# Patient Record
Sex: MALE | Race: WHITE | NOT HISPANIC OR LATINO | URBAN - METROPOLITAN AREA
[De-identification: names, ages, dates, MRNs, and addresses within clinical notes are randomized per-mention and may not be internally consistent; named-entity substitution may affect disease eponyms.]

---

## 2020-06-27 ENCOUNTER — TELEPHONE ENCOUNTER (OUTPATIENT)
Dept: URBAN - METROPOLITAN AREA CLINIC 92 | Facility: CLINIC | Age: 50
End: 2020-06-27

## 2020-06-27 RX ORDER — COLESEVELAM HYDROCHLORIDE 625 MG/1
1 TABLET TABLET, COATED ORAL TWICE A DAY
Qty: 180 TABLET | Refills: 0
Start: 2020-06-27

## 2020-09-28 ENCOUNTER — TELEPHONE ENCOUNTER (OUTPATIENT)
Dept: URBAN - METROPOLITAN AREA CLINIC 92 | Facility: CLINIC | Age: 50
End: 2020-09-28

## 2020-09-28 RX ORDER — COLESEVELAM HYDROCHLORIDE 625 MG/1
1 TABLET TABLET, COATED ORAL TWICE A DAY
Qty: 180 TABLET | Refills: 0
Start: 2020-06-27

## 2021-04-02 ENCOUNTER — TELEPHONE ENCOUNTER (OUTPATIENT)
Dept: URBAN - METROPOLITAN AREA CLINIC 118 | Facility: CLINIC | Age: 51
End: 2021-04-02

## 2021-10-19 ENCOUNTER — OFFICE VISIT (OUTPATIENT)
Dept: URBAN - METROPOLITAN AREA CLINIC 118 | Facility: CLINIC | Age: 51
End: 2021-10-19

## 2021-12-16 ENCOUNTER — WEB ENCOUNTER (OUTPATIENT)
Dept: URBAN - METROPOLITAN AREA CLINIC 118 | Facility: CLINIC | Age: 51
End: 2021-12-16

## 2021-12-16 RX ORDER — COLESEVELAM HYDROCHLORIDE 625 MG/1
1 TABLET TABLET, COATED ORAL TWICE A DAY
Qty: 180 TABLET | Refills: 0
Start: 2020-06-27

## 2021-12-22 ENCOUNTER — LAB OUTSIDE AN ENCOUNTER (OUTPATIENT)
Dept: URBAN - METROPOLITAN AREA CLINIC 118 | Facility: CLINIC | Age: 51
End: 2021-12-22

## 2021-12-22 ENCOUNTER — OFFICE VISIT (OUTPATIENT)
Dept: URBAN - METROPOLITAN AREA CLINIC 118 | Facility: CLINIC | Age: 51
End: 2021-12-22
Payer: COMMERCIAL

## 2021-12-22 DIAGNOSIS — R19.7 DIARRHEA, UNSPECIFIED TYPE: ICD-10-CM

## 2021-12-22 DIAGNOSIS — K50.10 CROHN'S DISEASE OF LARGE INTESTINE WITHOUT COMPLICATION: ICD-10-CM

## 2021-12-22 PROCEDURE — 99213 OFFICE O/P EST LOW 20 MIN: CPT | Performed by: INTERNAL MEDICINE

## 2021-12-22 RX ORDER — HYOSCYAMINE SULFATE 0.38 MG/1
1 TABLET TABLET, EXTENDED RELEASE ORAL
Qty: 60 | Refills: 5 | OUTPATIENT
Start: 2021-12-22 | End: 2022-06-20

## 2021-12-22 RX ORDER — COLESEVELAM HYDROCHLORIDE 625 MG/1
1 TABLET TABLET, COATED ORAL TWICE A DAY
Qty: 180 TABLET | Refills: 0 | Status: ACTIVE | COMMUNITY
Start: 2020-06-27

## 2021-12-22 NOTE — HPI-TODAY'S VISIT:
50 yo WM here for follow up.  Has a hx of Crohn's disease, last on meds in ~2015.  BMs 3-5x/d on Welchol, formed.  No GI bleed.  Occ abd discomfort, no N/V.  Weight is down 7# over the last 2 years, voluntarily.  In 5/2020, stool positive for C diff, and treated with Vancomycin.  Stool negative for fecal fat and WBC.

## 2022-01-20 LAB
A/G RATIO: 1.8
ALBUMIN: 4.7
ALKALINE PHOSPHATASE: 121
ALT (SGPT): 61
AST (SGOT): 36
BASO (ABSOLUTE): 0
BASOS: 0
BILIRUBIN, TOTAL: 1.5
BUN/CREATININE RATIO: 11
BUN: 10
C-REACTIVE PROTEIN, QUANT: 5
CALCIUM: 9.6
CARBON DIOXIDE, TOTAL: 22
CHLORIDE: 97
CREATININE: 0.9
EGFR IF AFRICN AM: 116
EGFR IF NONAFRICN AM: 100
EOS (ABSOLUTE): 0.1
EOS: 1
GLOBULIN, TOTAL: 2.6
GLUCOSE: 329
HEMATOCRIT: 43.8
HEMATOLOGY COMMENTS:: (no result)
HEMOGLOBIN: 14.9
IMMATURE CELLS: (no result)
IMMATURE GRANS (ABS): 0
IMMATURE GRANULOCYTES: 0
LYMPHS (ABSOLUTE): 2.2
LYMPHS: 27
MCH: 30.8
MCHC: 34
MCV: 91
MONOCYTES(ABSOLUTE): 0.5
MONOCYTES: 6
NEUTROPHILS (ABSOLUTE): 5.2
NEUTROPHILS: 66
NRBC: (no result)
PLATELETS: 331
POTASSIUM: 3.8
PROTEIN, TOTAL: 7.3
RBC: 4.83
RDW: 12.4
SODIUM: 137
VITAMIN B12: 588
WBC: 8

## 2022-04-29 ENCOUNTER — TELEPHONE ENCOUNTER (OUTPATIENT)
Dept: URBAN - METROPOLITAN AREA CLINIC 23 | Facility: CLINIC | Age: 52
End: 2022-04-29

## 2022-06-06 ENCOUNTER — OFFICE VISIT (OUTPATIENT)
Dept: URBAN - METROPOLITAN AREA SURGERY CENTER 23 | Facility: SURGERY CENTER | Age: 52
End: 2022-06-06
Payer: COMMERCIAL

## 2022-06-06 DIAGNOSIS — K50.80 CROHN'S COLITIS: ICD-10-CM

## 2022-06-06 PROCEDURE — G8907 PT DOC NO EVENTS ON DISCHARG: HCPCS | Performed by: INTERNAL MEDICINE

## 2022-06-06 PROCEDURE — 45380 COLONOSCOPY AND BIOPSY: CPT | Performed by: INTERNAL MEDICINE

## 2022-06-06 RX ORDER — COLESEVELAM HYDROCHLORIDE 625 MG/1
1 TABLET TABLET, COATED ORAL TWICE A DAY
Qty: 180 TABLET | Refills: 0 | Status: ACTIVE | COMMUNITY
Start: 2020-06-27

## 2022-06-06 RX ORDER — HYOSCYAMINE SULFATE 0.38 MG/1
1 TABLET TABLET, EXTENDED RELEASE ORAL
Qty: 60 | Refills: 5 | Status: ACTIVE | COMMUNITY
Start: 2021-12-22 | End: 2022-06-20

## 2023-04-11 ENCOUNTER — OFFICE VISIT (OUTPATIENT)
Dept: URBAN - METROPOLITAN AREA CLINIC 118 | Facility: CLINIC | Age: 53
End: 2023-04-11
Payer: COMMERCIAL

## 2023-04-11 VITALS
HEART RATE: 81 BPM | SYSTOLIC BLOOD PRESSURE: 142 MMHG | BODY MASS INDEX: 28.49 KG/M2 | TEMPERATURE: 97.7 F | HEIGHT: 70 IN | WEIGHT: 199 LBS | DIASTOLIC BLOOD PRESSURE: 89 MMHG

## 2023-04-11 DIAGNOSIS — R19.7 DIARRHEA, UNSPECIFIED TYPE: ICD-10-CM

## 2023-04-11 DIAGNOSIS — K50.10 CROHN'S DISEASE OF LARGE INTESTINE WITHOUT COMPLICATION: ICD-10-CM

## 2023-04-11 PROCEDURE — 99214 OFFICE O/P EST MOD 30 MIN: CPT | Performed by: INTERNAL MEDICINE

## 2023-04-11 RX ORDER — COLESEVELAM HYDROCHLORIDE 625 MG/1
1 TABLET TABLET, COATED ORAL TWICE A DAY
Qty: 180 TABLET | Refills: 0
Start: 2020-06-27

## 2023-04-11 RX ORDER — HYOSCYAMINE SULFATE 0.38 MG/1
1 TABLET TABLET, EXTENDED RELEASE ORAL
Qty: 60 | Refills: 5
Start: 2021-12-22 | End: 2023-10-08

## 2023-04-11 RX ORDER — COLESEVELAM HYDROCHLORIDE 625 MG/1
1 TABLET TABLET, COATED ORAL TWICE A DAY
Qty: 180 TABLET | Refills: 0 | Status: ACTIVE | COMMUNITY
Start: 2020-06-27

## 2023-04-11 NOTE — PHYSICAL EXAM GASTROINTESTINAL
Abdomen , soft, MILD LLQ tender, nondistended , no guarding or rigidity , no masses palpable , normal bowel sounds , Liver and Spleen , no hepatomegaly present , no hepatosplenomegaly , liver nontender , spleen not palpable

## 2023-04-11 NOTE — HPI-TODAY'S VISIT:
4/11/23 - 54 yo WM here for follow up of a hx of Crohn's disease.  He had active disease for ~ 22 years, and was on Remicade until 2011.  He was then on budesonide until 2015.  Since then, he has done relatively well, with no endoscopic evidence of active Crohn's disease. He has ongoing problems with diarrhea with upto 10 BMs/d, that is improved with Welchol bid to ~ 5x/d.  Stools formed but soft, with BRB on TP upto 1x/wk.  He has fecal incontinence at times, and will wear Depends at times.   No nocturnal diarrhea.  Has intermittent LLQ cramping discomfort, almost daily.  He is on metformin x 1 yr.  Has had voluntary weight loss.  No mouth ulcers.  He has joint aches constantly, long-standing. -------------------------------------------------------------------- 12/22/21 - 52 yo WM here for follow up.  Has a hx of Crohn's disease, last on meds in ~2015.  BMs 3-5x/d on Welchol, formed.  No GI bleed.  Occ abd discomfort, no N/V.  Weight is down 7# over the last 2 years, voluntarily.  In 5/2020, stool positive for C diff, and treated with Vancomycin.  Stool negative for fecal fat and WBC.

## 2023-04-17 LAB
A/G RATIO: 2.1
ABSOLUTE BASOPHILS: 39
ABSOLUTE EOSINOPHILS: 47
ABSOLUTE LYMPHOCYTES: 1997
ABSOLUTE MONOCYTES: 593
ABSOLUTE NEUTROPHILS: 5125
ALBUMIN: 4.8
ALKALINE PHOSPHATASE: 69
ALT (SGPT): 32
AST (SGOT): 19
BASOPHILS: 0.5
BILIRUBIN, TOTAL: 1.6
BUN/CREATININE RATIO: (no result)
BUN: 14
C-REACTIVE PROTEIN, QUANT: 1.8
CALCIUM: 9.9
CARBON DIOXIDE, TOTAL: 29
CHLORIDE: 100
CREATININE: 0.93
EGFR: 99
EOSINOPHILS: 0.6
GLOBULIN, TOTAL: 2.3
GLUCOSE: 77
HEMATOCRIT: 40.1
HEMOGLOBIN: 13.8
LYMPHOCYTES: 25.6
MCH: 29.5
MCHC: 34.4
MCV: 85.7
MONOCYTES: 7.6
MPV: 10.2
NEUTROPHILS: 65.7
PLATELET COUNT: 374
POTASSIUM: 4
PROTEIN, TOTAL: 7.1
RDW: 12.2
RED BLOOD CELL COUNT: 4.68
SODIUM: 138
WHITE BLOOD CELL COUNT: 7.8

## 2023-04-25 LAB — LEUKOCYTES: (no result)

## 2023-08-07 ENCOUNTER — TELEPHONE ENCOUNTER (OUTPATIENT)
Dept: URBAN - METROPOLITAN AREA CLINIC 118 | Facility: CLINIC | Age: 53
End: 2023-08-07

## 2023-08-07 RX ORDER — COLESEVELAM HYDROCHLORIDE 625 MG/1
1 TABLET TABLET, COATED ORAL TWICE A DAY
Qty: 180 TABLET | Refills: 0
Start: 2020-06-27

## 2023-11-08 ENCOUNTER — OFFICE VISIT (OUTPATIENT)
Dept: URBAN - METROPOLITAN AREA CLINIC 118 | Facility: CLINIC | Age: 53
End: 2023-11-08
Payer: COMMERCIAL

## 2023-11-08 VITALS
HEIGHT: 70 IN | TEMPERATURE: 98.1 F | BODY MASS INDEX: 29.49 KG/M2 | WEIGHT: 206 LBS | DIASTOLIC BLOOD PRESSURE: 94 MMHG | HEART RATE: 99 BPM | SYSTOLIC BLOOD PRESSURE: 146 MMHG

## 2023-11-08 DIAGNOSIS — R19.7 DIARRHEA, UNSPECIFIED TYPE: ICD-10-CM

## 2023-11-08 DIAGNOSIS — K50.10 CROHN'S DISEASE OF LARGE INTESTINE WITHOUT COMPLICATION: ICD-10-CM

## 2023-11-08 PROCEDURE — 99213 OFFICE O/P EST LOW 20 MIN: CPT | Performed by: INTERNAL MEDICINE

## 2023-11-08 RX ORDER — COLESEVELAM HYDROCHLORIDE 625 MG/1
1 TABLET TABLET, COATED ORAL TWICE A DAY
Qty: 180 TABLET | Refills: 0 | Status: ACTIVE | COMMUNITY
Start: 2020-06-27

## 2023-11-08 NOTE — HPI-TODAY'S VISIT:
11/8/23 - 54 yo WM here for follow up.  Doing relatively well, on Welchol bid, and qd Levbid.  BMs ~ 4-5x/d on average.  No GI bleed.  No abd pain, N/V, except occ nausea after meds.  Has ongoing problems with fecal incontinence, with occ Depends. ---------------------- 4/11/23 - 54 yo WM here for follow up of a hx of Crohn's disease.  He had active disease for ~ 22 years, and was on Remicade until 2011.  He was then on budesonide until 2015.  Since then, he has done relatively well, with no endoscopic evidence of active Crohn's disease. He has ongoing problems with diarrhea with upto 10 BMs/d, that is improved with Welchol bid to ~ 5x/d.  Stools formed but soft, with BRB on TP upto 1x/wk.  He has fecal incontinence at times, and will wear Depends at times.   No nocturnal diarrhea.  Has intermittent LLQ cramping discomfort, almost daily.  He is on metformin x 1 yr.  Has had voluntary weight loss.  No mouth ulcers.  He has joint aches constantly, long-standing. -------------------------------------------------------------------- 12/22/21 - 52 yo WM here for follow up.  Has a hx of Crohn's disease, last on meds in ~2015.  BMs 3-5x/d on Welchol, formed.  No GI bleed.  Occ abd discomfort, no N/V.  Weight is down 7# over the last 2 years, voluntarily.  In 5/2020, stool positive for C diff, and treated with Vancomycin.  Stool negative for fecal fat and WBC.

## 2023-11-21 ENCOUNTER — TELEPHONE ENCOUNTER (OUTPATIENT)
Dept: URBAN - METROPOLITAN AREA CLINIC 118 | Facility: CLINIC | Age: 53
End: 2023-11-21

## 2023-11-21 RX ORDER — COLESEVELAM HYDROCHLORIDE 625 MG/1
1 TABLET TABLET, COATED ORAL TWICE A DAY
Qty: 180 TABLET | Refills: 3
Start: 2020-06-27

## 2024-05-08 ENCOUNTER — OFFICE VISIT (OUTPATIENT)
Dept: URBAN - METROPOLITAN AREA CLINIC 118 | Facility: CLINIC | Age: 54
End: 2024-05-08

## 2024-05-22 ENCOUNTER — OFFICE VISIT (OUTPATIENT)
Dept: URBAN - METROPOLITAN AREA CLINIC 118 | Facility: CLINIC | Age: 54
End: 2024-05-22
Payer: COMMERCIAL

## 2024-05-22 ENCOUNTER — LAB OUTSIDE AN ENCOUNTER (OUTPATIENT)
Dept: URBAN - METROPOLITAN AREA CLINIC 118 | Facility: CLINIC | Age: 54
End: 2024-05-22

## 2024-05-22 ENCOUNTER — DASHBOARD ENCOUNTERS (OUTPATIENT)
Age: 54
End: 2024-05-22

## 2024-05-22 VITALS
HEART RATE: 88 BPM | WEIGHT: 211 LBS | HEIGHT: 68 IN | DIASTOLIC BLOOD PRESSURE: 93 MMHG | BODY MASS INDEX: 31.98 KG/M2 | TEMPERATURE: 97.7 F | SYSTOLIC BLOOD PRESSURE: 135 MMHG

## 2024-05-22 DIAGNOSIS — K50.10 CROHN'S DISEASE OF LARGE INTESTINE WITHOUT COMPLICATION: ICD-10-CM

## 2024-05-22 DIAGNOSIS — R19.7 DIARRHEA, UNSPECIFIED TYPE: ICD-10-CM

## 2024-05-22 PROCEDURE — 99213 OFFICE O/P EST LOW 20 MIN: CPT | Performed by: INTERNAL MEDICINE

## 2024-05-22 RX ORDER — COLESEVELAM HYDROCHLORIDE 625 MG/1
1 TABLET TABLET, COATED ORAL TWICE A DAY
Qty: 180 TABLET | Refills: 3 | Status: ACTIVE | COMMUNITY
Start: 2020-06-27

## 2024-05-22 RX ORDER — HYOSCYAMINE SULFATE 0.38 MG/1
TAKE ONE TABLET BY MOUTH EVERY 12 HOURS TABLET, EXTENDED RELEASE ORAL
Qty: 60 TABLET | Refills: 5 | Status: ACTIVE | COMMUNITY

## 2024-06-03 ENCOUNTER — OFFICE VISIT (OUTPATIENT)
Dept: URBAN - METROPOLITAN AREA SURGERY CENTER 23 | Facility: SURGERY CENTER | Age: 54
End: 2024-06-03
Payer: COMMERCIAL

## 2024-06-03 DIAGNOSIS — R19.7 ACUTE DIARRHEA: ICD-10-CM

## 2024-06-03 DIAGNOSIS — K63.89 APPENDICITIS EPIPLOICA: ICD-10-CM

## 2024-06-03 DIAGNOSIS — K50.80 CROHN'S COLITIS: ICD-10-CM

## 2024-06-03 DIAGNOSIS — K50.10 CC (CROHN'S COLITIS): ICD-10-CM

## 2024-06-03 PROCEDURE — 00811 ANES LWR INTST NDSC NOS: CPT | Performed by: NURSE ANESTHETIST, CERTIFIED REGISTERED

## 2024-06-03 PROCEDURE — 45380 COLONOSCOPY AND BIOPSY: CPT | Performed by: INTERNAL MEDICINE

## 2024-06-03 PROCEDURE — G8907 PT DOC NO EVENTS ON DISCHARG: HCPCS | Performed by: INTERNAL MEDICINE

## 2024-06-03 RX ORDER — COLESEVELAM HYDROCHLORIDE 625 MG/1
1 TABLET TABLET, COATED ORAL TWICE A DAY
Qty: 180 TABLET | Refills: 3 | Status: ACTIVE | COMMUNITY
Start: 2020-06-27

## 2024-06-03 RX ORDER — HYOSCYAMINE SULFATE 0.38 MG/1
TAKE ONE TABLET BY MOUTH EVERY 12 HOURS TABLET, EXTENDED RELEASE ORAL
Qty: 60 TABLET | Refills: 5 | Status: ACTIVE | COMMUNITY

## 2025-05-17 ENCOUNTER — ERX REFILL RESPONSE (OUTPATIENT)
Dept: URBAN - METROPOLITAN AREA CLINIC 118 | Facility: CLINIC | Age: 55
End: 2025-05-17

## 2025-05-17 RX ORDER — HYOSCYAMINE SULFATE 0.38 MG/1
TAKE ONE TABLET BY MOUTH EVERY 12 HOURS TABLET, EXTENDED RELEASE ORAL
Qty: 60 TABLET | Refills: 5 | OUTPATIENT